# Patient Record
Sex: FEMALE | Race: WHITE | URBAN - METROPOLITAN AREA
[De-identification: names, ages, dates, MRNs, and addresses within clinical notes are randomized per-mention and may not be internally consistent; named-entity substitution may affect disease eponyms.]

---

## 2018-09-27 ENCOUNTER — HOSPITAL ENCOUNTER (OUTPATIENT)
Dept: FAMILY MEDICINE CLINIC | Facility: CLINIC | Age: 33
Setting detail: SPECIMEN
Discharge: HOME | End: 2018-09-27
Attending: FAMILY MEDICINE | Admitting: FAMILY MEDICINE

## 2018-09-27 LAB
BASOPHILS # BLD AUTO: 0 10*3/UL (ref 0–0.2)
BASOPHILS NFR BLD AUTO: 0 % (ref 0–2)
CRP SERPL-MCNC: 0.91 MG/DL (ref 0–0.7)
DIFFERENTIAL METHOD BLD: (no result)
EOSINOPHIL # BLD AUTO: 0.1 10*3/UL (ref 0–0.3)
EOSINOPHIL # BLD AUTO: 2 % (ref 0–3)
ERYTHROCYTE [DISTWIDTH] IN BLOOD BY AUTOMATED COUNT: 14.6 % (ref 11.5–14.5)
ERYTHROCYTE [SEDIMENTATION RATE] IN BLOOD BY WESTERGREN METHOD: 22 MM/HR (ref 0–20)
HCT VFR BLD AUTO: 35.6 % (ref 35–49)
HGB BLD-MCNC: 11.9 G/DL (ref 12–15)
LYMPHOCYTES # BLD AUTO: 2.1 10*3/UL (ref 0.8–4.8)
LYMPHOCYTES NFR BLD AUTO: 32 % (ref 18–42)
MCH RBC QN AUTO: 28.9 PG (ref 26–32)
MCHC RBC AUTO-ENTMCNC: 33.5 G/DL (ref 32–36)
MCV RBC AUTO: 86.4 FL (ref 80–94)
MONOCYTES # BLD AUTO: 0.3 10*3/UL (ref 0.1–1.3)
MONOCYTES NFR BLD AUTO: 5 % (ref 2–11)
NEUTROPHILS # BLD AUTO: 4.1 10*3/UL (ref 2.3–8.6)
NEUTROPHILS NFR BLD AUTO: 61 % (ref 50–75)
NRBC BLD AUTO-RTO: 0 /100{WBCS}
NRBC/RBC NFR BLD MANUAL: 0 10*3/UL
PLATELET # BLD AUTO: 247 10*3/UL (ref 150–450)
PMV BLD AUTO: 9.5 FL (ref 7.4–10.4)
RBC # BLD AUTO: 4.13 10*6/UL (ref 4–5.4)
WBC # BLD AUTO: 6.6 10*3/UL (ref 4.5–11.5)

## 2018-09-27 PROCEDURE — 86481 TB AG RESPONSE T-CELL SUSP: CPT

## 2018-09-28 ENCOUNTER — LAB REQUISITION (OUTPATIENT)
Dept: LAB | Facility: HOSPITAL | Age: 33
End: 2018-09-28

## 2018-09-28 DIAGNOSIS — Z00.00 ROUTINE GENERAL MEDICAL EXAMINATION AT A HEALTH CARE FACILITY: ICD-10-CM

## 2018-09-28 LAB
A PHAGOCYTOPH IGG SER QL: NORMAL
A PHAGOCYTOPH IGM SER QL: NORMAL
ANA SER QL IA: NORMAL
B BURGDOR IGG+IGM SER-ACNC: NEGATIVE
CHROMATIN AB SERPL-ACNC: <20 [IU]/ML (ref 0–20)
CONV HIV-1/ HIV-2: NORMAL
CONV HIV-1/ HIV-2: NORMAL
E CHAFFEENSIS IGG TITR SER IF: NORMAL {TITER}
EHRLICHIA CHAFFEENSIS AB, IGM: NORMAL
R TYPHI IGG TITR SER IF: NOT DETECTED {TITER}
R TYPHI IGM SER-ACNC: NOT DETECTED
RICK SF IGG TITR SER IF: NOT DETECTED {TITER}
RICK SF IGM TITR SER IF: NOT DETECTED {TITER}
T PALLIDUM IGG SER QL: NONREACTIVE

## 2018-09-29 LAB
TSPOT INTERPRETATION: NEGATIVE
TSPOT NIL CONTROL INTERPRETATION: NORMAL
TSPOT PANEL A: 0
TSPOT PANEL B: 0
TSPOT POS CONTROL INTERPRETATION: NORMAL

## 2018-10-01 LAB — TSPOT INTERPRETATION: NORMAL

## 2023-04-25 ENCOUNTER — OFFICE VISIT (OUTPATIENT)
Dept: FAMILY MEDICINE CLINIC | Facility: CLINIC | Age: 38
End: 2023-04-25
Payer: COMMERCIAL

## 2023-04-25 VITALS
BODY MASS INDEX: 28.82 KG/M2 | DIASTOLIC BLOOD PRESSURE: 60 MMHG | TEMPERATURE: 95.7 F | WEIGHT: 173 LBS | OXYGEN SATURATION: 100 % | SYSTOLIC BLOOD PRESSURE: 102 MMHG | HEART RATE: 61 BPM | HEIGHT: 65 IN

## 2023-04-25 DIAGNOSIS — Z11.59 ENCOUNTER FOR HEPATITIS C SCREENING TEST FOR LOW RISK PATIENT: ICD-10-CM

## 2023-04-25 DIAGNOSIS — E03.9 HYPOTHYROIDISM, UNSPECIFIED TYPE: ICD-10-CM

## 2023-04-25 DIAGNOSIS — B02.29 HERPES ZOSTER INVOLVING CERVICAL DERMATOME: ICD-10-CM

## 2023-04-25 DIAGNOSIS — F32.81 PMDD (PREMENSTRUAL DYSPHORIC DISORDER): ICD-10-CM

## 2023-04-25 DIAGNOSIS — E78.00 HYPERCHOLESTEREMIA: ICD-10-CM

## 2023-04-25 DIAGNOSIS — Z76.89 ESTABLISHING CARE WITH NEW DOCTOR, ENCOUNTER FOR: Primary | ICD-10-CM

## 2023-04-25 DIAGNOSIS — R61 NIGHT SWEATS: ICD-10-CM

## 2023-04-25 DIAGNOSIS — B00.1 HERPES LABIALIS: ICD-10-CM

## 2023-04-25 DIAGNOSIS — R45.86 MOOD CHANGES: ICD-10-CM

## 2023-04-25 DIAGNOSIS — R53.83 OTHER FATIGUE: ICD-10-CM

## 2023-04-25 DIAGNOSIS — I73.00 RAYNAUD'S DISEASE WITHOUT GANGRENE: ICD-10-CM

## 2023-04-25 RX ORDER — LEVOTHYROXINE SODIUM 50 MCG
50 TABLET ORAL DAILY
COMMUNITY
Start: 2023-03-07

## 2023-04-25 RX ORDER — SERTRALINE HYDROCHLORIDE 100 MG/1
100 TABLET, FILM COATED ORAL 2 TIMES DAILY
COMMUNITY
Start: 2023-02-27

## 2023-04-25 RX ORDER — NORGESTIMATE AND ETHINYL ESTRADIOL 0.25-0.035
0.25 KIT ORAL DAILY
COMMUNITY
Start: 2023-01-29

## 2023-04-25 RX ORDER — ACYCLOVIR 50 MG/G
OINTMENT TOPICAL
COMMUNITY
Start: 2023-03-14

## 2023-04-25 NOTE — PROGRESS NOTES
"Chief Complaint  Depression, Anxiety, and Fatigue    Subjective          Wanda Henao presents to [facility]      History of Present Illness    Wanda is a 38 year old female patient who presents today to [facility] care.     She was formerly of [employer] [employer]. Has hx of hyperlipidemia, hypothyroidism, depression, anxiety, Raynaud's disease.     She takes estarylla and been on for more than ten years. She reports that week of or a few days before her period she with symptoms of tiredness and fatigue, hot flashes, intermittent night sweats.  Additionally, her mood shifts around her cycle.  She reports her  always tells her her \"mood is off \"during this time.  She does report once her thyroid got straightened out her hot flashes improved and she does not have any longer. She is with low back pain for some of the days during her cycle typically only last a few days. She follows Dr Alonzo monthly for chiropractic adjustments.  She reports this helped a lot    She sees a therapist which has improved her depression and anxiety.  She does continue to take Zoloft 100 mg twice daily. she works with her  and two other family members which is stressful. 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     Has history of Raynaud's - flares up more so in the fall and winter or cold environments like grocery, carrying cold items, etc. Does not interfere with her day to day activities.  does frequently wear gloves during the winter months to keep hands warm.     She does have herpes labialis. She also has a rash breakout once a year on cervical neck.  Patient reports every February rash develops, is painful before, during and several weeks after.  Area has healed, but pink and macular currently.  She has no pain at this moment but does report the pain lingers after lesions have healed. She indicates it burns and tingles prior to outbreak. Exam reveals this appears " "to be a form of shingles.     Requests 3 months supply of medications.  She currently does not need refills at the moment.    Blood pressure well today at 102/60.  No cardiac symptoms or complaints.      Objective     Vital Signs:   /60 (BP Location: Right arm, Patient Position: Sitting, Cuff Size: Adult)   Pulse 61   Temp 95.7 °F (35.4 °C) (Infrared)   Ht 165.1 cm (65\")   Wt 78.5 kg (173 lb)   SpO2 100%   BMI 28.79 kg/m²           Physical Exam  Vitals reviewed.   Constitutional:       Appearance: Normal appearance. She is well-developed.      Comments: Wearing a face mask     HENT:      Head: Normocephalic and atraumatic.      Nose: Nose normal.      Mouth/Throat:      Mouth: Mucous membranes are moist.      Pharynx: Oropharynx is clear.   Eyes:      Conjunctiva/sclera: Conjunctivae normal.      Pupils: Pupils are equal, round, and reactive to light.   Cardiovascular:      Rate and Rhythm: Normal rate and regular rhythm.      Pulses: Normal pulses.      Heart sounds: Normal heart sounds. No murmur heard.  Pulmonary:      Effort: Pulmonary effort is normal. No respiratory distress.      Breath sounds: Normal breath sounds.   Abdominal:      General: Bowel sounds are normal. There is no distension.      Palpations: Abdomen is soft. There is no mass.      Tenderness: There is no abdominal tenderness. There is no guarding or rebound.      Hernia: No hernia is present.   Musculoskeletal:         General: Normal range of motion.      Cervical back: Normal range of motion.   Skin:     General: Skin is warm and dry.      Findings: No rash.             Comments: Midline cervical neck dermatome with macular dark pink scarring present.  No active lesions.   Neurological:      Mental Status: She is alert and oriented to person, place, and time.   Psychiatric:         Behavior: Behavior normal.                Result Review :                                   Assessment and Plan      Diagnoses and all orders for " this visit:    1. Establishing care with new doctor, encounter for (Primary)    2. Hypothyroidism, unspecified type  -     Vitamin D 25 hydroxy  -     Vitamin B12  -     TSH  -     T4  -     T3, free    3. Other fatigue  -     Vitamin D 25 hydroxy  -     Vitamin B12  -     TSH  -     T4  -     T3, free  -     Iron and TIBC  -     Ferritin  -     CBC & Differential  -     Comprehensive Metabolic Panel  -     Hemoglobin A1c  -     Estradiol  -     Follicle Stimulating Hormone  -     Testosterone, Free, Total  -     Progesterone    4. PMDD (premenstrual dysphoric disorder)    5. Mood changes  -     Estradiol  -     Follicle Stimulating Hormone  -     Testosterone, Free, Total  -     Progesterone    6. Night sweats  -     Estradiol  -     Follicle Stimulating Hormone  -     Testosterone, Free, Total  -     Progesterone    7. Herpes labialis    8. Herpes zoster involving cervical dermatome    9. Hypercholesteremia  -     Lipid Panel With LDL / HDL Ratio    10. Raynaud's disease without gangrene    11. Encounter for hepatitis C screening test for low risk patient  -     Hepatitis C antibody      Patient newly establishing care.  She has a few chronic medical conditions but her main concern is to get some lab work today to check her thyroid fatigue and other complaints.  Patient thinks she is having hormonal issues.  She is wanting to check her female hormones.  Night sweats could be related to Zoloft, thyroid, other underlying condition.  Very infrequent and not extremely bothersome to patient.                    Follow Up       No follow-ups on file.      Patient was given instructions and counseling regarding her condition or for health maintenance advice. Please see specific information pulled into the AVS if appropriate.     Reny Nieves, APRN4/25/202312:44 EDT  This note has been electronically signed

## 2023-04-26 LAB
25(OH)D3+25(OH)D2 SERPL-MCNC: 33.6 NG/ML (ref 30–100)
ALBUMIN SERPL-MCNC: 4.7 G/DL (ref 3.8–4.8)
ALBUMIN/GLOB SERPL: 1.7 {RATIO} (ref 1.2–2.2)
ALP SERPL-CCNC: 51 IU/L (ref 44–121)
ALT SERPL-CCNC: 12 IU/L (ref 0–32)
AST SERPL-CCNC: 22 IU/L (ref 0–40)
BASOPHILS # BLD AUTO: 0 X10E3/UL (ref 0–0.2)
BASOPHILS NFR BLD AUTO: 0 %
BILIRUB SERPL-MCNC: 0.4 MG/DL (ref 0–1.2)
BUN SERPL-MCNC: 14 MG/DL (ref 6–20)
BUN/CREAT SERPL: 17 (ref 9–23)
CALCIUM SERPL-MCNC: 9.5 MG/DL (ref 8.7–10.2)
CHLORIDE SERPL-SCNC: 104 MMOL/L (ref 96–106)
CHOLEST SERPL-MCNC: 307 MG/DL (ref 100–199)
CO2 SERPL-SCNC: 22 MMOL/L (ref 20–29)
CREAT SERPL-MCNC: 0.82 MG/DL (ref 0.57–1)
EGFRCR SERPLBLD CKD-EPI 2021: 93 ML/MIN/1.73
EOSINOPHIL # BLD AUTO: 0.1 X10E3/UL (ref 0–0.4)
EOSINOPHIL NFR BLD AUTO: 1 %
ERYTHROCYTE [DISTWIDTH] IN BLOOD BY AUTOMATED COUNT: 13.1 % (ref 11.7–15.4)
ESTRADIOL SERPL-MCNC: 136 PG/ML
FERRITIN SERPL-MCNC: 15 NG/ML (ref 15–150)
FSH SERPL-ACNC: 8.7 MIU/ML
GLOBULIN SER CALC-MCNC: 2.7 G/DL (ref 1.5–4.5)
GLUCOSE SERPL-MCNC: 79 MG/DL (ref 70–99)
HBA1C MFR BLD: 5.2 % (ref 4.8–5.6)
HCT VFR BLD AUTO: 37.2 % (ref 34–46.6)
HCV IGG SERPL QL IA: NON REACTIVE
HDLC SERPL-MCNC: 61 MG/DL
HGB BLD-MCNC: 12.6 G/DL (ref 11.1–15.9)
IMM GRANULOCYTES # BLD AUTO: 0 X10E3/UL (ref 0–0.1)
IMM GRANULOCYTES NFR BLD AUTO: 0 %
IRON SATN MFR SERPL: 16 % (ref 15–55)
IRON SERPL-MCNC: 80 UG/DL (ref 27–159)
LABORATORY COMMENT REPORT: ABNORMAL
LDLC SERPL CALC-MCNC: 208 MG/DL (ref 0–99)
LDLC/HDLC SERPL: 3.4 RATIO (ref 0–3.2)
LYMPHOCYTES # BLD AUTO: 1.9 X10E3/UL (ref 0.7–3.1)
LYMPHOCYTES NFR BLD AUTO: 31 %
MCH RBC QN AUTO: 28.8 PG (ref 26.6–33)
MCHC RBC AUTO-ENTMCNC: 33.9 G/DL (ref 31.5–35.7)
MCV RBC AUTO: 85 FL (ref 79–97)
MONOCYTES # BLD AUTO: 0.4 X10E3/UL (ref 0.1–0.9)
MONOCYTES NFR BLD AUTO: 6 %
NEUTROPHILS # BLD AUTO: 3.8 X10E3/UL (ref 1.4–7)
NEUTROPHILS NFR BLD AUTO: 62 %
PLATELET # BLD AUTO: 239 X10E3/UL (ref 150–450)
POTASSIUM SERPL-SCNC: 5.3 MMOL/L (ref 3.5–5.2)
PROGEST SERPL-MCNC: 5.2 NG/ML
PROT SERPL-MCNC: 7.4 G/DL (ref 6–8.5)
RBC # BLD AUTO: 4.38 X10E6/UL (ref 3.77–5.28)
SODIUM SERPL-SCNC: 142 MMOL/L (ref 134–144)
T3FREE SERPL-MCNC: 3.4 PG/ML (ref 2–4.4)
T4 SERPL-MCNC: 8.5 UG/DL (ref 4.5–12)
TIBC SERPL-MCNC: 494 UG/DL (ref 250–450)
TRIGL SERPL-MCNC: 198 MG/DL (ref 0–149)
TSH SERPL DL<=0.005 MIU/L-ACNC: 3.03 UIU/ML (ref 0.45–4.5)
UIBC SERPL-MCNC: 414 UG/DL (ref 131–425)
VIT B12 SERPL-MCNC: 407 PG/ML (ref 232–1245)
VLDLC SERPL CALC-MCNC: 38 MG/DL (ref 5–40)
WBC # BLD AUTO: 6.2 X10E3/UL (ref 3.4–10.8)

## 2023-04-27 DIAGNOSIS — B02.29 HERPES ZOSTER INVOLVING CERVICAL DERMATOME: Primary | ICD-10-CM

## 2023-04-27 DIAGNOSIS — E78.00 HYPERCHOLESTEREMIA: ICD-10-CM

## 2023-04-27 LAB
TESTOST FREE SERPL-MCNC: <0.2 PG/ML (ref 0–4.2)
TESTOST SERPL-MCNC: 11 NG/DL (ref 8–60)

## 2023-04-27 RX ORDER — ACYCLOVIR 400 MG/1
800 TABLET ORAL
Qty: 70 TABLET | Refills: 0 | Status: SHIPPED | OUTPATIENT
Start: 2023-04-27 | End: 2023-05-04

## 2023-04-27 RX ORDER — ROSUVASTATIN CALCIUM 10 MG/1
10 TABLET, COATED ORAL DAILY
Qty: 90 TABLET | Refills: 3 | Status: SHIPPED | OUTPATIENT
Start: 2023-04-27

## 2023-04-27 NOTE — PROGRESS NOTES
Please advise patient statin has been sent in.  Additionally, I sent prescription for acyclovir to be taken 5 times daily, 2 capsules each dose.  She will do this for 7 days.

## 2023-04-27 NOTE — PROGRESS NOTES
Please notify Wanda that her labs have returned.  She is not diabetic or prediabetic. Blood counts are all normal, thyroid panel normal.  Vitamin panel normal.  Hormonal panel all normal.  Her cholesterol is worse than it was 3 years ago.  I do recommend placing on a low-dose statin for prevention due to family history.  If she is willing to do so please let me know so I can send that in?  It is taken once daily.    Her iron level is slightly elevated but this is not anything significant.

## 2023-05-11 DIAGNOSIS — I73.00 RAYNAUD'S DISEASE WITHOUT GANGRENE: Primary | ICD-10-CM

## 2023-05-11 DIAGNOSIS — B00.1 HERPES LABIALIS: ICD-10-CM

## 2023-05-11 DIAGNOSIS — B02.29 HERPES ZOSTER INVOLVING CERVICAL DERMATOME: ICD-10-CM

## 2023-05-15 DIAGNOSIS — B02.29 HERPES ZOSTER INVOLVING CERVICAL DERMATOME: Primary | ICD-10-CM

## 2023-10-30 RX ORDER — ACYCLOVIR 50 MG/G
OINTMENT TOPICAL EVERY 4 HOURS
Qty: 30 G | Refills: 1 | Status: SHIPPED | OUTPATIENT
Start: 2023-10-30

## 2023-10-30 RX ORDER — SERTRALINE HYDROCHLORIDE 100 MG/1
100 TABLET, FILM COATED ORAL DAILY
Qty: 90 TABLET | Refills: 1 | Status: SHIPPED | OUTPATIENT
Start: 2023-10-30

## 2023-10-30 RX ORDER — LEVOTHYROXINE SODIUM 50 MCG
50 TABLET ORAL DAILY
Qty: 90 TABLET | Refills: 1 | Status: SHIPPED | OUTPATIENT
Start: 2023-10-30

## 2023-10-30 RX ORDER — NORGESTIMATE AND ETHINYL ESTRADIOL 0.25-0.035
1 KIT ORAL DAILY
Qty: 28 TABLET | Refills: 11 | Status: SHIPPED | OUTPATIENT
Start: 2023-10-30

## 2024-01-09 ENCOUNTER — OFFICE VISIT (OUTPATIENT)
Dept: FAMILY MEDICINE CLINIC | Facility: CLINIC | Age: 39
End: 2024-01-09
Payer: COMMERCIAL

## 2024-01-09 VITALS
BODY MASS INDEX: 29.32 KG/M2 | OXYGEN SATURATION: 99 % | HEART RATE: 67 BPM | WEIGHT: 176 LBS | DIASTOLIC BLOOD PRESSURE: 70 MMHG | HEIGHT: 65 IN | SYSTOLIC BLOOD PRESSURE: 92 MMHG | TEMPERATURE: 94.6 F

## 2024-01-09 DIAGNOSIS — B00.1 HERPES LABIALIS: ICD-10-CM

## 2024-01-09 DIAGNOSIS — E66.3 OVERWEIGHT (BMI 25.0-29.9): ICD-10-CM

## 2024-01-09 DIAGNOSIS — E03.9 HYPOTHYROIDISM, UNSPECIFIED TYPE: Primary | ICD-10-CM

## 2024-01-09 DIAGNOSIS — B02.29 HERPES ZOSTER INVOLVING CERVICAL DERMATOME: ICD-10-CM

## 2024-01-09 DIAGNOSIS — E78.00 HYPERCHOLESTEREMIA: ICD-10-CM

## 2024-01-09 PROBLEM — Z11.59 ENCOUNTER FOR HEPATITIS C SCREENING TEST FOR LOW RISK PATIENT: Status: RESOLVED | Noted: 2023-04-25 | Resolved: 2024-01-09

## 2024-01-09 PROCEDURE — 99395 PREV VISIT EST AGE 18-39: CPT | Performed by: NURSE PRACTITIONER

## 2024-01-09 NOTE — PROGRESS NOTES
"Chief Complaint  Annual Exam        Wanda Henao presents to [facility]        Subjective      38 year old female patient who presents today for annual physical.     She is on Zoloft and doing well overall. No fluctuations in mood. She is seeing a therapist and has for a few years. She switched jobs last year and is happier with that change. She is going with a friend to do [employer]ak[employer] yoga and really like this as she is more aware of herself and surroundings. BP excellent. She is eating healthier and really enjoying things more now than last year. She is less fatigued and with more energy than last year.     Has been more consistent with thyroid mediation on schedule of taking. Currently on synthroid 50 mcg daily. No palpitations, hot or cold intolerances.     She does take in multiple animals of all sorts and often gets scratched or bit by squirrels, raccoons, farm animals, and opossums. 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     Pt is still with herpetic outbreaks on lips, nape of neck. She was unable to get to referral last year due to insurance issues. She would like to see provider again.                         Objective         Vital Signs:     BP 92/70 (BP Location: Left arm, Patient Position: Sitting, Cuff Size: Adult)   Pulse 67   Temp 94.6 °F (34.8 °C) (Infrared)   Ht 165.1 cm (65\")   Wt 79.8 kg (176 lb)   SpO2 99%   BMI 29.29 kg/m²       Physical Exam  Vitals reviewed.   Constitutional:       Appearance: She is well-developed.      Comments:      HENT:      Head: Normocephalic and atraumatic.      Nose: Nose normal.      Mouth/Throat:      Lips: Pink. Lesions present.      Mouth: Mucous membranes are moist.      Pharynx: Oropharynx is clear.        Comments: Small macular erythematous lesion, nonvesicular.  Eyes:      Conjunctiva/sclera: Conjunctivae normal.      Pupils: Pupils are equal, round, and reactive to light.   Cardiovascular:      Rate and " Rhythm: Normal rate and regular rhythm.      Pulses: Normal pulses.      Heart sounds: Normal heart sounds.   Pulmonary:      Effort: Pulmonary effort is normal. No respiratory distress.      Breath sounds: Normal breath sounds. No wheezing, rhonchi or rales.   Chest:      Chest wall: No tenderness.   Abdominal:      General: Bowel sounds are normal. There is no distension.      Palpations: Abdomen is soft. There is no mass.      Tenderness: There is no abdominal tenderness. There is no guarding or rebound.      Hernia: No hernia is present.   Musculoskeletal:         General: Normal range of motion.      Cervical back: Normal range of motion.   Skin:     General: Skin is warm and dry.      Findings: Lesion present. No rash.             Comments: 2 single 3 mm erythematous macular lesions nonvesicular noted left nape of neck.   Neurological:      Mental Status: She is alert and oriented to person, place, and time.   Psychiatric:         Attention and Perception: Attention normal.         Mood and Affect: Mood and affect normal.         Speech: Speech normal.         Behavior: Behavior normal. Behavior is cooperative.         Thought Content: Thought content normal.                History of Present Illness      Patient Active Problem List   Diagnosis    PMDD (premenstrual dysphoric disorder)    Night sweats    Establishing care with new doctor, encounter for    Hypercholesteremia    Hypothyroidism    Raynaud's disease without gangrene    Mood changes    Other fatigue    Herpes zoster involving cervical dermatome    Herpes labialis    BMI 29.0-29.9,adult    Overweight (BMI 25.0-29.9)         Past Medical History:   Diagnosis Date    Depression 2001    Sertraline since 2013.    Encounter for hepatitis C screening test for low risk patient     Hypothyroidism ? 2019    Hot flashes, nightsweats.          Family History   Problem Relation Age of Onset    Tremor Mother     Anxiety disorder Mother     Arthritis Mother      Heart disease Father     Hyperlipidemia Father     Heart attack Father     Mental illness Father         On Eliud’s side of the family    No Known Problems Sister     Hyperlipidemia Sister     Thyroid disease Sister     Raynaud syndrome Sister     Depression Sister     Hyperlipidemia Brother     Tremor Brother           Past Surgical History:   Procedure Laterality Date    HIP SURGERY Left 1997          Social History     Socioeconomic History    Marital status:    Tobacco Use    Smoking status: Never    Smokeless tobacco: Current    Tobacco comments:     None   Substance and Sexual Activity    Alcohol use: Yes     Alcohol/week: 2.0 standard drinks of alcohol     Types: 2 Drinks containing 0.5 oz of alcohol per week    Drug use: Never    Sexual activity: Yes     Partners: Male     Birth control/protection: Birth control pill                    Result Review :                                             Assessment and Plan      Diagnoses and all orders for this visit:    1. Hypothyroidism, unspecified type (Primary)  -     CBC & Differential  -     Comprehensive metabolic panel  -     TSH    2. Hypercholesteremia  -     CBC & Differential  -     Comprehensive metabolic panel  -     Lipid Panel With LDL / HDL Ratio    3. Herpes zoster involving cervical dermatome  -     Ambulatory Referral to Dermatology    4. Herpes labialis  -     Ambulatory Referral to Dermatology    5. BMI 29.0-29.9,adult    6. Overweight (BMI 25.0-29.9)  Assessment & Plan:  Patient's (Body mass index is 29.29 kg/m².) indicates that they are overweight with health conditions that include none . Weight is newly identified. BMI is is above average; BMI management plan is completed. We discussed portion control and increasing exercise.         Patient here for annual physical today.  No major concerns or issues.  She is still on her Zoloft for mood which is doing well.  She is due for her thyroid panel checked.  Stays on Synthroid 50 mcg daily  and is more consistent with the times that she is taking daily.  Continues to take statin.  She is with herpes outbreak oral recurrent and developed similar lesions on the nape of the neck.  Could be shingles but she never was able to see specialist last year going to refer her to dermatology for further evaluation. Not certain what this recurrent rash is.  When she comes to see me is often scabbed over.  She does have a healed lesion on her left lower lip.  Patient follow back annually unless other issues    The patient was counseled regarding nutrition, physical activity, healthy weight, injury prevention, misuse of tobacco, alcohol and illicit drugs, sexual behavior and STI's, contraception, dental health, mental health, immunizations, and screenings.                      Follow Up       Return in about 1 year (around 1/9/2025) for with ANGELINA Page, Annual physical.      Patient was given instructions and counseling regarding her condition or for health maintenance advice. Please see specific information pulled into the AVS if appropriate.     Reny Nieves, APRN1/9/202410:59 EST  This note has been electronically signed

## 2024-01-09 NOTE — ASSESSMENT & PLAN NOTE
Patient's (Body mass index is 29.29 kg/m².) indicates that they are overweight with health conditions that include none . Weight is newly identified. BMI is is above average; BMI management plan is completed. We discussed portion control and increasing exercise.

## 2024-01-10 LAB
ALBUMIN SERPL-MCNC: 4.7 G/DL (ref 3.9–4.9)
ALBUMIN/GLOB SERPL: 2.1 {RATIO} (ref 1.2–2.2)
ALP SERPL-CCNC: 43 IU/L (ref 44–121)
ALT SERPL-CCNC: 23 IU/L (ref 0–32)
AST SERPL-CCNC: 31 IU/L (ref 0–40)
BASOPHILS # BLD AUTO: 0 X10E3/UL (ref 0–0.2)
BASOPHILS NFR BLD AUTO: 0 %
BILIRUB SERPL-MCNC: 0.3 MG/DL (ref 0–1.2)
BUN SERPL-MCNC: 13 MG/DL (ref 6–20)
BUN/CREAT SERPL: 15 (ref 9–23)
CALCIUM SERPL-MCNC: 9.6 MG/DL (ref 8.7–10.2)
CHLORIDE SERPL-SCNC: 106 MMOL/L (ref 96–106)
CHOLEST SERPL-MCNC: 164 MG/DL (ref 100–199)
CO2 SERPL-SCNC: 24 MMOL/L (ref 20–29)
CREAT SERPL-MCNC: 0.85 MG/DL (ref 0.57–1)
EGFRCR SERPLBLD CKD-EPI 2021: 89 ML/MIN/1.73
EOSINOPHIL # BLD AUTO: 0.1 X10E3/UL (ref 0–0.4)
EOSINOPHIL NFR BLD AUTO: 3 %
ERYTHROCYTE [DISTWIDTH] IN BLOOD BY AUTOMATED COUNT: 13.2 % (ref 11.7–15.4)
GLOBULIN SER CALC-MCNC: 2.2 G/DL (ref 1.5–4.5)
GLUCOSE SERPL-MCNC: 90 MG/DL (ref 70–99)
HCT VFR BLD AUTO: 35.6 % (ref 34–46.6)
HDLC SERPL-MCNC: 61 MG/DL
HGB BLD-MCNC: 11.9 G/DL (ref 11.1–15.9)
IMM GRANULOCYTES # BLD AUTO: 0 X10E3/UL (ref 0–0.1)
IMM GRANULOCYTES NFR BLD AUTO: 0 %
LDLC SERPL CALC-MCNC: 82 MG/DL (ref 0–99)
LDLC/HDLC SERPL: 1.3 RATIO (ref 0–3.2)
LYMPHOCYTES # BLD AUTO: 1.7 X10E3/UL (ref 0.7–3.1)
LYMPHOCYTES NFR BLD AUTO: 33 %
MCH RBC QN AUTO: 28.8 PG (ref 26.6–33)
MCHC RBC AUTO-ENTMCNC: 33.4 G/DL (ref 31.5–35.7)
MCV RBC AUTO: 86 FL (ref 79–97)
MONOCYTES # BLD AUTO: 0.3 X10E3/UL (ref 0.1–0.9)
MONOCYTES NFR BLD AUTO: 7 %
NEUTROPHILS # BLD AUTO: 3 X10E3/UL (ref 1.4–7)
NEUTROPHILS NFR BLD AUTO: 57 %
PLATELET # BLD AUTO: 219 X10E3/UL (ref 150–450)
POTASSIUM SERPL-SCNC: 5.2 MMOL/L (ref 3.5–5.2)
PROT SERPL-MCNC: 6.9 G/DL (ref 6–8.5)
RBC # BLD AUTO: 4.13 X10E6/UL (ref 3.77–5.28)
SODIUM SERPL-SCNC: 141 MMOL/L (ref 134–144)
TRIGL SERPL-MCNC: 122 MG/DL (ref 0–149)
TSH SERPL DL<=0.005 MIU/L-ACNC: 2.08 UIU/ML (ref 0.45–4.5)
VLDLC SERPL CALC-MCNC: 21 MG/DL (ref 5–40)
WBC # BLD AUTO: 5.2 X10E3/UL (ref 3.4–10.8)

## 2024-01-31 ENCOUNTER — TELEPHONE (OUTPATIENT)
Dept: FAMILY MEDICINE CLINIC | Facility: CLINIC | Age: 39
End: 2024-01-31
Payer: COMMERCIAL

## 2024-01-31 NOTE — TELEPHONE ENCOUNTER
Caller: Wanda Henao    Relationship: Self    Best call back number: [phone]     Requested Prescriptions:   Requested Prescriptions     Pending Prescriptions Disp Refills    Estarylla 0.25-35 MG-MCG per tablet 28 tablet 11     Sig: Take 1 tablet by mouth Daily.        Pharmacy where request should be sent: [facility] #[id number] - [place], [place] - [address]  AT [place] [phone] [place] [phone] FX     Last office visit with prescribing clinician: 1/9/2024   Last telemedicine visit with prescribing clinician: Visit date not found   Next office visit with prescribing clinician: Visit date not found     Additional details provided by patient: CAN NOT FIND HER BIRTH CONTROL PILLS NEEDS ONE URGENT PRESCRIPTION (ONE MONTH)SENT TO LOCAL PHARMACY   [id number] - [place], [place] - [address]  AT [facility] - [phone] [place] [phone] FX      Does the patient have less than a 3 day supply:  [x] Yes  [] No    Would you like a call back once the refill request has been completed: [x] Yes [] No    If the office needs to give you a call back, can they leave a voicemail: [x] Yes [] No    PATIENT WOULD THEN LIKE A REGULAR REFILL SENT TO HER MAIL ORDER PHARMACY AS WELL  [facility] [address] [phone] [facility] [phone] FX     Mehran Beaver Rep   01/31/24 09:07 EST

## 2024-02-01 RX ORDER — NORGESTIMATE AND ETHINYL ESTRADIOL 0.25-0.035
1 KIT ORAL DAILY
Qty: 28 TABLET | Refills: 0 | Status: SHIPPED | OUTPATIENT
Start: 2024-02-01 | End: 2024-02-01

## 2024-02-01 RX ORDER — NORGESTIMATE AND ETHINYL ESTRADIOL 0.25-0.035
1 KIT ORAL DAILY
Qty: 84 TABLET | Refills: 0 | Status: SHIPPED | OUTPATIENT
Start: 2024-02-01

## 2024-02-01 NOTE — TELEPHONE ENCOUNTER
Immediate prescription of oral contraceptive has been sent to [facility].  Mail order has the refills from the last time I sent those. throbbing/constant/tightness

## 2024-04-18 DIAGNOSIS — E78.00 HYPERCHOLESTEREMIA: ICD-10-CM

## 2024-04-18 RX ORDER — LEVOTHYROXINE SODIUM 50 MCG
50 TABLET ORAL DAILY
Qty: 90 TABLET | Refills: 3 | Status: SHIPPED | OUTPATIENT
Start: 2024-04-18

## 2024-04-18 RX ORDER — NORGESTIMATE AND ETHINYL ESTRADIOL 0.25-0.035
1 KIT ORAL DAILY
Qty: 84 TABLET | Refills: 3 | Status: SHIPPED | OUTPATIENT
Start: 2024-04-18

## 2024-04-18 RX ORDER — SERTRALINE HYDROCHLORIDE 100 MG/1
100 TABLET, FILM COATED ORAL DAILY
Qty: 90 TABLET | Refills: 3 | Status: SHIPPED | OUTPATIENT
Start: 2024-04-18

## 2024-04-18 RX ORDER — ROSUVASTATIN CALCIUM 10 MG/1
10 TABLET, COATED ORAL DAILY
Qty: 90 TABLET | Refills: 3 | Status: SHIPPED | OUTPATIENT
Start: 2024-04-18

## 2024-04-18 NOTE — TELEPHONE ENCOUNTER
Caller: Wanda Henao    Relationship: Self    Best call back number: [phone]     Requested Prescriptions:   Requested Prescriptions     Pending Prescriptions Disp Refills    Estarylla 0.25-35 MG-MCG per tablet 84 tablet 0     Sig: Take 1 tablet by mouth Daily.    sertraline (ZOLOFT) 100 MG tablet 90 tablet 1     Sig: Take 1 tablet by mouth Daily.    rosuvastatin (Crestor) 10 MG tablet 90 tablet 3     Sig: Take 1 tablet by mouth Daily.    Synthroid 50 MCG tablet 90 tablet 1     Sig: Take 1 tablet by mouth Daily.        Pharm[facility] be sent[facility] [place] - [address] [facility] AT [address] TO [address] [facility] [address] - [phone]  - [phone] FX     Last office visit with prescribing clinician: 1/9/2024   Last telemedicine visit with prescribing clinician: Visit date not found   Next office visit with prescribing clinician: Visit date not found     Additional details provided by patient: PATENT WOULD LIKE  TO BE SET UP ON A THREE MONTH SUPPLY INSTEAD OF MONTHLY WITH THESE MEDICATION.     Does the patient have less than a 3 day supply:  [] Yes  [x] No    Would you like a call back once the refill request has been completed: [] Yes [x] No    If the office needs to give you a call back, can they leave a voicemail: [] Yes [x] No    Mehran Calhoun Rep   04/18/24 15:19 EDT

## 2024-05-03 ENCOUNTER — TELEPHONE (OUTPATIENT)
Dept: FAMILY MEDICINE CLINIC | Facility: CLINIC | Age: 39
End: 2024-05-03
Payer: COMMERCIAL

## 2024-05-03 DIAGNOSIS — E78.00 HYPERCHOLESTEREMIA: ICD-10-CM

## 2024-05-03 RX ORDER — ROSUVASTATIN CALCIUM 10 MG/1
10 TABLET, COATED ORAL DAILY
Qty: 90 TABLET | Refills: 3 | Status: SHIPPED | OUTPATIENT
Start: 2024-05-03

## 2024-05-03 RX ORDER — SERTRALINE HYDROCHLORIDE 100 MG/1
100 TABLET, FILM COATED ORAL DAILY
Qty: 90 TABLET | Refills: 3 | Status: SHIPPED | OUTPATIENT
Start: 2024-05-03

## 2024-05-03 RX ORDER — LEVOTHYROXINE SODIUM 50 MCG
50 TABLET ORAL DAILY
Qty: 90 TABLET | Refills: 3 | Status: SHIPPED | OUTPATIENT
Start: 2024-05-03

## 2024-05-03 RX ORDER — NORGESTIMATE AND ETHINYL ESTRADIOL 0.25-0.035
1 KIT ORAL DAILY
Qty: 84 TABLET | Refills: 0 | Status: SHIPPED | OUTPATIENT
Start: 2024-05-03

## 2024-05-03 NOTE — TELEPHONE ENCOUNTER
Caller: Wanda Henao    Relationship: Self    Best call back number: [phone]*    Requested Prescriptions:   Requested Prescriptions     Pending Prescriptions Disp Refills    rosuvastatin (Crestor) 10 MG tablet 90 tablet 3     Sig: Take 1 tablet by mouth Daily.    sertraline (ZOLOFT) 100 MG tablet 90 tablet 3     Sig: Take 1 tablet by mouth Daily.    Synthroid 50 MCG tablet 90 tablet 3     Sig: Take 1 tablet by mouth Daily.        Pharmacy where request should be sent: [facility] #[id number] - [place], [place] - [address] AT [facility] - [phone] [facility] [phone] FX     Last office visit with prescribing clinician: 1/9/2024   Last telemedicine visit with prescribing clinician: Visit date not found   Next office visit with prescribing clinician: Visit date not found     Additional details provided by patient: PATIENT NEEDING NEW 90-DAY PRESCRIPTIONS SENT TO [facility], DUE TO INSURANCE SHE IS NO LONGER USING [place]. PATIENT STATES SHE HAS BEEN OUT OF MEDICATION FOR 2 WEEKS.    Does the patient have less than a 3 day supply:  [x] Yes  [] No    Would you like a call back once the refill request has been completed: [] Yes [x] No    If the office needs to give you a call back, can they leave a voicemail: [] Yes [x] No    Ryan Delatorre   05/03/24 13:16 EDT

## 2024-05-03 NOTE — TELEPHONE ENCOUNTER
Pharmacy Name: [facility] #[id number] - [place], [place] - [address] AT [facility] [address] [facility] - [phone] [facility] [phone] FX     What medication are you calling in regards to: Estarylla 0.25-35 MG-MCG per tablet     What question does the pharmacy have: PATIENT HAS NOT RECEIVED MEDICATION SENT ON 4/18 TO MAIL PHARMACY. JG HAS BEEN OUT OF MEDICATION FOR ONE WEEK AND IS REQUESTING EMERGENCY SCRIPT SENT TO LOCAL [facility]

## 2024-07-29 RX ORDER — NORGESTIMATE AND ETHINYL ESTRADIOL 0.25-0.035
1 KIT ORAL DAILY
Qty: 84 TABLET | Refills: 0 | Status: SHIPPED | OUTPATIENT
Start: 2024-07-29

## 2024-08-28 ENCOUNTER — OFFICE VISIT (OUTPATIENT)
Dept: FAMILY MEDICINE CLINIC | Facility: CLINIC | Age: 39
End: 2024-08-28
Payer: COMMERCIAL

## 2024-08-28 ENCOUNTER — TELEPHONE (OUTPATIENT)
Dept: FAMILY MEDICINE CLINIC | Facility: CLINIC | Age: 39
End: 2024-08-28

## 2024-08-28 VITALS
HEART RATE: 64 BPM | DIASTOLIC BLOOD PRESSURE: 60 MMHG | OXYGEN SATURATION: 99 % | HEIGHT: 65 IN | SYSTOLIC BLOOD PRESSURE: 108 MMHG | BODY MASS INDEX: 29.99 KG/M2 | TEMPERATURE: 96.6 F | WEIGHT: 180 LBS

## 2024-08-28 DIAGNOSIS — E78.00 HYPERCHOLESTEREMIA: ICD-10-CM

## 2024-08-28 DIAGNOSIS — Z00.00 PREVENTATIVE HEALTH CARE: ICD-10-CM

## 2024-08-28 DIAGNOSIS — E03.9 HYPOTHYROIDISM, UNSPECIFIED TYPE: Primary | ICD-10-CM

## 2024-08-28 DIAGNOSIS — Z12.31 OTHER SCREENING MAMMOGRAM: ICD-10-CM

## 2024-08-28 PROCEDURE — 99214 OFFICE O/P EST MOD 30 MIN: CPT | Performed by: NURSE PRACTITIONER

## 2024-08-28 RX ORDER — VALACYCLOVIR HYDROCHLORIDE 1 G/1
1000 TABLET, FILM COATED ORAL 2 TIMES DAILY
Qty: 20 TABLET | Refills: 0 | Status: SHIPPED | OUTPATIENT
Start: 2024-08-28

## 2024-08-28 RX ORDER — FLUCONAZOLE 150 MG/1
TABLET ORAL
Qty: 1 TABLET | Refills: 0 | Status: SHIPPED | OUTPATIENT
Start: 2024-08-28 | End: 2024-08-28 | Stop reason: SDUPTHER

## 2024-08-28 RX ORDER — FLUCONAZOLE 150 MG/1
TABLET ORAL
Qty: 2 TABLET | Refills: 0 | Status: SHIPPED | OUTPATIENT
Start: 2024-08-28

## 2024-08-28 RX ORDER — SERTRALINE HYDROCHLORIDE 100 MG/1
100 TABLET, FILM COATED ORAL DAILY
Qty: 180 TABLET | Refills: 2 | Status: SHIPPED | OUTPATIENT
Start: 2024-08-28

## 2024-08-28 NOTE — PROGRESS NOTES
Wanda Henao is a 39 y.o. female.     History of Present Illness  39-year-old white female patient who presents today with history of hypothyroidism, Raynaud's disease, hyperlipidemia, herpes, depression anxiety who comes in today to be established as a new patient    Blood pressure 108/60 heart rate 64 she denies any chest pain, dyspnea, tachycardia or dizziness    Patient has a lot of hot flashes apparently due to her thyroid levels.  She has never had a thyroid ultrasound has not been endocrinology will order both today and will get thyroid levels with blood work    Patient Zoloft dose was called in incorrectly she takes 100 mg twice a day I called that in    She is also complaining of a yeast infection we will call in some Diflucan    Patient has bumps on the back of her neck around the scalp and on the scalp it has been there for over a year and a half that are painful.  She was initially diagnosed with shingles but she states was never treated for it.  She has been a 1 dermatologist who said they did not know what it was.  I am going to treat her with Valtrex for 10 days if no improvement will refer her to a different dermatologist.  I advised her to not use any shampoos that have fragrances and possibly get allergy tested.  She does have 1 spot now on the inside of her right ear    Weight is 180 with a BMI of 29.9.  She has had no COVID vaccines need to schedule an eye exam.  I am ordering her her first mammogram at [place] which she will schedule and I am setting her up with OB/GYN for Pap smears and to be checked for HPV since she states she does have HPV          Fasting blood work  Thyroid ultrasound  Mammogram at [place]  Diflucan 150 daily x 2 days  Valtrex 1 g twice daily x 10 days  Do not use any shampoos with fragrances  Dermatology referral if Valtrex does not work         The following portions of the patient's history were reviewed and updated as appropriate: allergies, current medications, past  "family history, past medical history, past social history, past surgical history, and problem list.    Vitals:    08/28/24 1304   BP: 108/60   BP Location: Right arm   Patient Position: Sitting   Cuff Size: Adult   Pulse: 64   Temp: 96.6 °F (35.9 °C)   TempSrc: Infrared   SpO2: 99%   Weight: 81.6 kg (180 lb)   Height: 165.1 cm (65\")     Body mass index is 29.95 kg/m².          Past Medical History:   Diagnosis Date    Depression 2001    Sertraline since 2013.    Encounter for hepatitis C screening test for low risk patient     Hypothyroidism ? 2019    Hot flashes, nightsweats.     Past Surgical History:   Procedure Laterality Date    HIP SURGERY Left 1997     Family History   Problem Relation Age of Onset    Tremor Mother     Anxiety disorder Mother     Arthritis Mother     Heart disease Father     Hyperlipidemia Father     Heart attack Father     Mental illness Father         On Eliud’s side of the family    No Known Problems Sister     Hyperlipidemia Sister     Thyroid disease Sister     Raynaud syndrome Sister     Depression Sister     Hyperlipidemia Brother     Tremor Brother      Immunization History   Administered Date(s) Administered    Flu Vaccine Quad PF 6-35MO 01/07/2019    Fluzone (or Fluarix & Flulaval for VFC) >6mos 01/07/2019    Hepatitis A 04/26/2006, 10/27/2006    Hepatitis B Adult/Adolescent IM 06/11/2002, 07/16/2002, 05/03/2006    IPV 05/03/2006    MMR 07/02/1985, 03/16/1995    Meningococcal MCV4P (Menactra) 04/26/2006    PPD Test 09/18/2006    Td (TDVAX) 07/21/2000    Tdap 05/03/2006, 08/01/2013    Typhoid Inactivated 09/20/2013    Typhoid Live 04/26/2006       Office Visit on 01/09/2024   Component Date Value Ref Range Status    WBC 01/09/2024 5.2  3.4 - 10.8 x10E3/uL Final    RBC 01/09/2024 4.13  3.77 - 5.28 x10E6/uL Final    Hemoglobin 01/09/2024 11.9  11.1 - 15.9 g/dL Final    Hematocrit 01/09/2024 35.6  34.0 - 46.6 % Final    MCV 01/09/2024 86  79 - 97 fL Final    MCH 01/09/2024 28.8  26.6 - " 33.0 pg Final    MCHC 01/09/2024 33.4  31.5 - 35.7 g/dL Final    RDW 01/09/2024 13.2  11.7 - 15.4 % Final    Platelets 01/09/2024 219  150 - 450 x10E3/uL Final    Neutrophil Rel % 01/09/2024 57  Not Estab. % Final    Lymphocyte Rel % 01/09/2024 33  Not Estab. % Final    Monocyte Rel % 01/09/2024 7  Not Estab. % Final    Eosinophil Rel % 01/09/2024 3  Not Estab. % Final    Basophil Rel % 01/09/2024 0  Not Estab. % Final    Neutrophils Absolute 01/09/2024 3.0  1.4 - 7.0 x10E3/uL Final    Lymphocytes Absolute 01/09/2024 1.7  0.7 - 3.1 x10E3/uL Final    Monocytes Absolute 01/09/2024 0.3  0.1 - 0.9 x10E3/uL Final    Eosinophils Absolute 01/09/2024 0.1  0.0 - 0.4 x10E3/uL Final    Basophils Absolute 01/09/2024 0.0  0.0 - 0.2 x10E3/uL Final    Immature Granulocyte Rel % 01/09/2024 0  Not Estab. % Final    Immature Grans Absolute 01/09/2024 0.0  0.0 - 0.1 x10E3/uL Final    Glucose 01/09/2024 90  70 - 99 mg/dL Final    BUN 01/09/2024 13  6 - 20 mg/dL Final    Creatinine 01/09/2024 0.85  0.57 - 1.00 mg/dL Final    EGFR Result 01/09/2024 89  >59 mL/min/1.73 Final    BUN/Creatinine Ratio 01/09/2024 15  9 - 23 Final    Sodium 01/09/2024 141  134 - 144 mmol/L Final    Potassium 01/09/2024 5.2  3.5 - 5.2 mmol/L Final    Chloride 01/09/2024 106  96 - 106 mmol/L Final    Total CO2 01/09/2024 24  20 - 29 mmol/L Final    Calcium 01/09/2024 9.6  8.7 - 10.2 mg/dL Final    Total Protein 01/09/2024 6.9  6.0 - 8.5 g/dL Final    Albumin 01/09/2024 4.7  3.9 - 4.9 g/dL Final    Globulin 01/09/2024 2.2  1.5 - 4.5 g/dL Final    A/G Ratio 01/09/2024 2.1  1.2 - 2.2 Final    Total Bilirubin 01/09/2024 0.3  0.0 - 1.2 mg/dL Final    Alkaline Phosphatase 01/09/2024 43 (L)  44 - 121 IU/L Final    AST (SGOT) 01/09/2024 31  0 - 40 IU/L Final    ALT (SGPT) 01/09/2024 23  0 - 32 IU/L Final    TSH 01/09/2024 2.080  0.450 - 4.500 uIU/mL Final    Total Cholesterol 01/09/2024 164  100 - 199 mg/dL Final    Triglycerides 01/09/2024 122  0 - 149 mg/dL Final     HDL Cholesterol 01/09/2024 61  >39 mg/dL Final    VLDL Cholesterol Fredy 01/09/2024 21  5 - 40 mg/dL Final    LDL Chol Calc (NIH) 01/09/2024 82  0 - 99 mg/dL Final    LDL/HDL RATIO 01/09/2024 1.3  0.0 - 3.2 ratio Final    Comment:                                     LDL/HDL Ratio                                              Men  Women                                1/2 Avg.Risk  1.0    1.5                                    Avg.Risk  3.6    3.2                                 2X Avg.Risk  6.2    5.0                                 3X Avg.Risk  8.0    6.1           Review of Systems    Objective   Physical Exam    Procedures    Assessment & Plan   Diagnoses and all orders for this visit:    1. Hypothyroidism, unspecified type (Primary)  -     Ambulatory Referral to Endocrinology  -     TSH+Free T4; Future  -     T3; Future    2. Hypercholesteremia  -     Lipid Panel With LDL / HDL Ratio; Future    3. Preventative health care  -     CBC & Differential; Future  -     Comprehensive Metabolic Panel; Future  -     Hemoglobin A1c; Future    4. Other screening mammogram  -     Mammo Screening Digital Tomosynthesis Bilateral With CAD; Future    Other orders  -     sertraline (ZOLOFT) 100 MG tablet; Take 1 tablet by mouth Daily.  Dispense: 180 tablet; Refill: 2  -     fluconazole (Diflucan) 150 MG tablet; One tab daily x2 tabs  Dispense: 1 tablet; Refill: 0  -     valACYclovir (Valtrex) 1000 MG tablet; Take 1 tablet by mouth 2 (Two) Times a Day.  Dispense: 20 tablet; Refill: 0           Current Outpatient Medications:     sertraline (ZOLOFT) 100 MG tablet, Take 1 tablet by mouth Daily., Disp: 180 tablet, Rfl: 2    acyclovir (ZOVIRAX) 5 % ointment, Apply  topically to the appropriate area as directed Every 4 (Four) Hours., Disp: 30 g, Rfl: 1    Estarylla 0.25-35 MG-MCG per tablet, TAKE 1 TABLET BY MOUTH DAILY, Disp: 84 tablet, Rfl: 0    fluconazole (Diflucan) 150 MG tablet, One tab daily x2 tabs, Disp: 1 tablet, Rfl: 0     rosuvastatin (Crestor) 10 MG tablet, Take 1 tablet by mouth Daily., Disp: 90 tablet, Rfl: 3    Synthroid 50 MCG tablet, Take 1 tablet by mouth Daily., Disp: 90 tablet, Rfl: 3    valACYclovir (Valtrex) 1000 MG tablet, Take 1 tablet by mouth 2 (Two) Times a Day., Disp: 20 tablet, Rfl: 0           Linda Keane, BRIGIDO 8/28/2024 14:37 EDT  This note has been electronically signed

## 2024-08-28 NOTE — PATIENT INSTRUCTIONS
Fasting blood work  Thyroid ultrasound  Mammogram at [place]  Diflucan 150 daily x 2 days  Valtrex 1 g twice daily x 10 days  Do not use any shampoos with fragrances  Dermatology referral if Valtrex does not work

## 2024-08-30 LAB
ALBUMIN SERPL-MCNC: 4.3 G/DL (ref 3.9–4.9)
ALP SERPL-CCNC: 46 IU/L (ref 44–121)
ALT SERPL-CCNC: 10 IU/L (ref 0–32)
AST SERPL-CCNC: 17 IU/L (ref 0–40)
BASOPHILS # BLD AUTO: 0 X10E3/UL (ref 0–0.2)
BASOPHILS NFR BLD AUTO: 0 %
BILIRUB SERPL-MCNC: 0.4 MG/DL (ref 0–1.2)
BUN SERPL-MCNC: 16 MG/DL (ref 6–24)
BUN/CREAT SERPL: 17 (ref 9–23)
CALCIUM SERPL-MCNC: 9.3 MG/DL (ref 8.7–10.2)
CHLORIDE SERPL-SCNC: 103 MMOL/L (ref 96–106)
CHOLEST SERPL-MCNC: 217 MG/DL (ref 100–199)
CO2 SERPL-SCNC: 21 MMOL/L (ref 20–29)
CREAT SERPL-MCNC: 0.92 MG/DL (ref 0.57–1)
EGFRCR SERPLBLD CKD-EPI 2021: 81 ML/MIN/1.73
EOSINOPHIL # BLD AUTO: 0.3 X10E3/UL (ref 0–0.4)
EOSINOPHIL NFR BLD AUTO: 4 %
ERYTHROCYTE [DISTWIDTH] IN BLOOD BY AUTOMATED COUNT: 13.4 % (ref 11.7–15.4)
GLOBULIN SER CALC-MCNC: 2.5 G/DL (ref 1.5–4.5)
GLUCOSE SERPL-MCNC: 85 MG/DL (ref 70–99)
HBA1C MFR BLD: 5.4 % (ref 4.8–5.6)
HCT VFR BLD AUTO: 39 % (ref 34–46.6)
HDLC SERPL-MCNC: 53 MG/DL
HGB BLD-MCNC: 12.3 G/DL (ref 11.1–15.9)
IMM GRANULOCYTES # BLD AUTO: 0 X10E3/UL (ref 0–0.1)
IMM GRANULOCYTES NFR BLD AUTO: 0 %
LDLC SERPL CALC-MCNC: 115 MG/DL (ref 0–99)
LDLC/HDLC SERPL: 2.2 RATIO (ref 0–3.2)
LYMPHOCYTES # BLD AUTO: 2.1 X10E3/UL (ref 0.7–3.1)
LYMPHOCYTES NFR BLD AUTO: 27 %
MCH RBC QN AUTO: 29.5 PG (ref 26.6–33)
MCHC RBC AUTO-ENTMCNC: 31.5 G/DL (ref 31.5–35.7)
MCV RBC AUTO: 94 FL (ref 79–97)
MONOCYTES # BLD AUTO: 0.4 X10E3/UL (ref 0.1–0.9)
MONOCYTES NFR BLD AUTO: 5 %
NEUTROPHILS # BLD AUTO: 5 X10E3/UL (ref 1.4–7)
NEUTROPHILS NFR BLD AUTO: 64 %
PLATELET # BLD AUTO: 220 X10E3/UL (ref 150–450)
POTASSIUM SERPL-SCNC: 4.7 MMOL/L (ref 3.5–5.2)
PROT SERPL-MCNC: 6.8 G/DL (ref 6–8.5)
RBC # BLD AUTO: 4.17 X10E6/UL (ref 3.77–5.28)
SODIUM SERPL-SCNC: 138 MMOL/L (ref 134–144)
T3 SERPL-MCNC: 144 NG/DL (ref 71–180)
T4 FREE SERPL-MCNC: 0.97 NG/DL (ref 0.82–1.77)
TRIGL SERPL-MCNC: 286 MG/DL (ref 0–149)
TSH SERPL DL<=0.005 MIU/L-ACNC: 8.9 UIU/ML (ref 0.45–4.5)
VLDLC SERPL CALC-MCNC: 49 MG/DL (ref 5–40)
WBC # BLD AUTO: 7.8 X10E3/UL (ref 3.4–10.8)

## 2024-09-01 RX ORDER — LEVOTHYROXINE SODIUM 75 UG/1
75 TABLET ORAL DAILY
Qty: 90 TABLET | Refills: 0 | Status: SHIPPED | OUTPATIENT
Start: 2024-09-01

## 2024-09-13 ENCOUNTER — TELEPHONE (OUTPATIENT)
Dept: FAMILY MEDICINE CLINIC | Facility: CLINIC | Age: 39
End: 2024-09-13
Payer: COMMERCIAL

## 2024-10-29 ENCOUNTER — OFFICE VISIT (OUTPATIENT)
Dept: ENDOCRINOLOGY | Facility: CLINIC | Age: 39
End: 2024-10-29
Payer: COMMERCIAL

## 2024-10-29 VITALS
HEART RATE: 51 BPM | OXYGEN SATURATION: 96 % | WEIGHT: 176 LBS | HEIGHT: 65 IN | SYSTOLIC BLOOD PRESSURE: 96 MMHG | DIASTOLIC BLOOD PRESSURE: 68 MMHG | BODY MASS INDEX: 29.32 KG/M2

## 2024-10-29 DIAGNOSIS — E03.9 HYPOTHYROIDISM, UNSPECIFIED TYPE: Primary | ICD-10-CM

## 2024-10-29 DIAGNOSIS — E66.3 OVERWEIGHT (BMI 25.0-29.9): ICD-10-CM

## 2024-10-29 PROCEDURE — 99204 OFFICE O/P NEW MOD 45 MIN: CPT | Performed by: INTERNAL MEDICINE

## 2024-10-29 NOTE — PROGRESS NOTES
-----------------------------------------------------------------  ENDOCRINE CLINIC NOTE  -----------------------------------------------------------------        PATIENT NAME: Wanda Henao  PATIENT : 1985 AGE: 39 y.o.  MRN NUMBER: [id number]  PRIMARY CARE: Linda Keane APRN    ==========================================================================    CHIEF COMPLAINT: Hypothyroidism  DATE OF SERVICE: 10/29/24    ==========================================================================    HPI / SUBJECTIVE    39 y.o. female is seen in the clinic today for evaluation of hypothyroidism.  Patient reports that she was diagnosed with hypothyroidism around 5 years ago.  Patient is currently taking levothyroxine 50 mcg every day.  Patient is properly taking medication.  Patient reports that for the same amount of time that is 5 years she had been experiencing hot flashes and night sweats.  Patient reports to have regular cycles.    - Fatigue: +ve  - Cold Intolerance: Reports hot intolerance and have hx of Raynaud's phenomena and have digital cold feeling  - Weight gain: None  - Appetite: good  - Anxiety / Depression: Stable after losing jobs  - HTN: not on meds  - Easy brusing: none  - Emotional lability: none  - Irritability: none  - Skin changes: none  - Hair changes: none    - Neck Swelling: none    - Family hx of thyroid problems: yes sister have thyroidectomy    - Last lab work: Aug 2024    ==========================================================================                                                PAST MEDICAL HISTORY    Past Medical History:   Diagnosis Date    Depression     Sertraline since .    Encounter for hepatitis C screening test for low risk patient     Hyperlipidemia     Hereditary    Hypothyroidism ?     Hot flashes, nightsweats.       ==========================================================================    PAST SURGICAL HISTORY    Past Surgical History:    Procedure Laterality Date    HIP SURGERY Left 1997       ==========================================================================    FAMILY HISTORY    Family History   Problem Relation Age of Onset    Tremor Mother     Anxiety disorder Mother     Arthritis Mother     Heart disease Father     Hyperlipidemia Father     Heart attack Father     Mental illness Father         On Eliud’s side of the family    No Known Problems Sister     Hyperlipidemia Sister     Thyroid disease Sister         Her thyroid has now been removed    Raynaud syndrome Sister     Depression Sister     Hyperlipidemia Brother     Tremor Brother        ==========================================================================    SOCIAL HISTORY    Social History     Socioeconomic History    Marital status:    Tobacco Use    Smoking status: Never    Smokeless tobacco: Current    Tobacco comments:     None   Vaping Use    Vaping status: Never Used   Substance and Sexual Activity    Alcohol use: Yes     Alcohol/week: 2.0 standard drinks of alcohol     Types: 2 Drinks containing 0.5 oz of alcohol per week    Drug use: Never    Sexual activity: Yes     Partners: Male     Birth control/protection: Birth control pill       ==========================================================================    MEDICATIONS      Current Outpatient Medications:     Estarylla 0.25-35 MG-MCG per tablet, TAKE 1 TABLET BY MOUTH DAILY, Disp: 84 tablet, Rfl: 0    rosuvastatin (Crestor) 10 MG tablet, Take 1 tablet by mouth Daily., Disp: 90 tablet, Rfl: 3    sertraline (ZOLOFT) 100 MG tablet, Take 1 tablet by mouth Daily., Disp: 180 tablet, Rfl: 2    ==========================================================================    ALLERGIES    Allergies   Allergen Reactions    Bee Venom Anaphylaxis       ==========================================================================    OBJECTIVE    Vitals:    10/29/24 1327   BP: 96/68   Pulse: 51   SpO2: 96%     Body mass index  is 29.29 kg/m².     General: Alert, cooperative, no acute distress  Thyroid:  no enlargement/tenderness/palpable nodules  Lungs: Clear to auscultation bilaterally, respirations unlabored  Heart: Regular rate and rhythm, S1 and S2 normal, no murmur, rub or gallop  Abdomen: Soft, NT, ND and Bowel sounds Positive  Extremities:  Extremities normal, atraumatic, no cyanosis or edema    ==========================================================================    LAB EVALUATION    Lab Results   Component Value Date    GLUCOSE 85 08/29/2024    BUN 16 08/29/2024    CREATININE 0.92 08/29/2024    BCR 17 08/29/2024    K 4.7 08/29/2024    CO2 21 08/29/2024    CALCIUM 9.3 08/29/2024    PROTENTOTREF 6.8 08/29/2024    ALBUMIN 4.3 08/29/2024    LABIL2 2.1 01/09/2024    AST 17 08/29/2024    ALT 10 08/29/2024    TRIG 286 (H) 08/29/2024    HDL 53 08/29/2024     (H) 08/29/2024     Lab Results   Component Value Date    HGBA1C 5.4 08/29/2024    HGBA1C 5.2 04/25/2023     Lab Results   Component Value Date    CREATININE 0.92 08/29/2024     Lab Results   Component Value Date    TSH 8.900 (H) 08/29/2024    FREET4 0.97 08/29/2024    THYROIDAB <3.00 04/01/2019       ==========================================================================    ASSESSMENT AND PLAN    # Hypothyroidism  - Discussed with patient in detail about pathophysiology of thyroid gland and thyroid hormone to which verbalized understanding  - Last blood work was in August 2024 which showed elevated TSH with normal free T4  - Patient previously had TPO antibodies done in 2019 which was negative  - Will repeat TPO antibodies again  - Additionally patient is on biotin therapy therefore we will always check free T4 with dialysis will continue to monitor TSH  - Patient is currently taking levothyroxine 50 mcg p.o. daily, properly taking medication  - Counseled patient to avoid taking any thyroid herbal medication to visualize understanding as well  - Will check thyroid  function in next 5 days after patient hold biotin therapy and then recheck back again in 2 months and in 4 months time  - Depending on to the levels will adjust therapy of levothyroxine  - Will also start patient on liothyronine low-dose 5 mcg in the morning as well  - Will also provide patient with the samples of Synthyroid today in the clinic to try brand-name and see if she feels different with it  - Patient to follow remotely back again in 4 months time    # Overweight with BMI of 29.29    Thank you for courtesy of consultation.    Return to clinic: 4 months    Entire assessment and plan was discussed and counseled the patient in detail to which patient verbalized understanding and agreed with care.  Answered all queries and concerns.    Part of this note may be an electronic transcription/translation of spoken language to printed text using the Dragon Dictation System.     Note: Portions of this note may have been copied from previous notes but documentation have been reviewed and edited as necessary to support clinical decision making for today's visit.    ==========================================================================    INFORMATION PROVIDED TO PATIENT    Patient Instructions   Please,    - Continue levothyroxine replacement therapy at 50 mcg p.o. daily.  - Stop taking biotin supplements and get thyroid lab work done in 5 days.  - Will review her blood work results and adjust therapy accordingly.  - Otherwise repeat blood work back again in 2 months time and in 4 months time    Clinical follow-up in 4 months time.    Thank you for your visit today.    If you have any questions or concerns please feel free to reach out of the office.       ==========================================================================  Jovan De La Cruz MD  Department of Endocrine, Diabetes and Metabolism  [facility], [place]  ==========================================================================

## 2024-10-29 NOTE — PATIENT INSTRUCTIONS
Please,    - Continue levothyroxine replacement therapy at 50 mcg p.o. daily.  - Stop taking biotin supplements and get thyroid lab work done in 5 days.  - Will review her blood work results and adjust therapy accordingly.  - Otherwise repeat blood work back again in 2 months time and in 4 months time    Clinical follow-up in 4 months time.    Thank you for your visit today.    If you have any questions or concerns please feel free to reach out of the office.

## 2024-11-05 DIAGNOSIS — E78.00 HYPERCHOLESTEREMIA: ICD-10-CM

## 2024-11-05 RX ORDER — NORGESTIMATE AND ETHINYL ESTRADIOL 0.25-0.035
1 KIT ORAL DAILY
Qty: 84 TABLET | Refills: 0 | Status: SHIPPED | OUTPATIENT
Start: 2024-11-05

## 2024-11-05 RX ORDER — SERTRALINE HYDROCHLORIDE 100 MG/1
100 TABLET, FILM COATED ORAL DAILY
Qty: 180 TABLET | Refills: 0 | Status: SHIPPED | OUTPATIENT
Start: 2024-11-05

## 2024-11-05 RX ORDER — ROSUVASTATIN CALCIUM 10 MG/1
10 TABLET, COATED ORAL DAILY
Qty: 90 TABLET | Refills: 0 | Status: SHIPPED | OUTPATIENT
Start: 2024-11-05

## 2024-11-05 NOTE — TELEPHONE ENCOUNTER
Wanda wants to know if she can have a 1 month supply on her meds this month and then after that she wants them to go to [facility] rx pharmacy. She also said that her Sertraline should be  1 to 2 pills a day.

## 2024-11-20 LAB
T4 FREE SERPL DIALY-MCNC: 2.4 NG/DL
TSH SERPL DL<=0.005 MIU/L-ACNC: 1.89 UIU/ML (ref 0.45–4.5)

## 2024-11-21 DIAGNOSIS — E78.00 HYPERCHOLESTEREMIA: ICD-10-CM

## 2024-11-24 RX ORDER — ROSUVASTATIN CALCIUM 10 MG/1
10 TABLET, COATED ORAL DAILY
Qty: 90 TABLET | Refills: 0 | Status: SHIPPED | OUTPATIENT
Start: 2024-11-24

## 2024-11-24 RX ORDER — SERTRALINE HYDROCHLORIDE 100 MG/1
100 TABLET, FILM COATED ORAL DAILY
Qty: 180 TABLET | Refills: 0 | Status: SHIPPED | OUTPATIENT
Start: 2024-11-24

## 2024-11-24 RX ORDER — NORGESTIMATE AND ETHINYL ESTRADIOL 0.25-0.035
1 KIT ORAL DAILY
Qty: 84 TABLET | Refills: 0 | OUTPATIENT
Start: 2024-11-24

## 2025-01-22 LAB
T4 FREE SERPL DIALY-MCNC: 1 NG/DL
TSH SERPL DL<=0.005 MIU/L-ACNC: 2.82 UIU/ML (ref 0.45–4.5)

## 2025-01-27 RX ORDER — NORGESTIMATE AND ETHINYL ESTRADIOL 0.25-0.035
1 KIT ORAL DAILY
Qty: 84 TABLET | Refills: 0 | OUTPATIENT
Start: 2025-01-27

## 2025-02-04 ENCOUNTER — TELEPHONE (OUTPATIENT)
Dept: FAMILY MEDICINE CLINIC | Facility: CLINIC | Age: 40
End: 2025-02-04
Payer: COMMERCIAL

## 2025-02-04 NOTE — TELEPHONE ENCOUNTER
Wanda came by and said that she got her pap smear done on Nov, 21 and she also had her mammogram done wanting to know if she can get her birthcontrol now that every thing is done, [facility] is the one she went to for her pap. She would like a 3 month supply of her birthcontrol at a time if  that is possible,

## 2025-02-10 ENCOUNTER — TELEPHONE (OUTPATIENT)
Dept: FAMILY MEDICINE CLINIC | Facility: CLINIC | Age: 40
End: 2025-02-10
Payer: COMMERCIAL

## 2025-02-10 NOTE — TELEPHONE ENCOUNTER
MAMMOGRAM DONE AT [facility] AROUND MAY (POSSIBLY AUGUST)  AND WAS CLEAR     Name:       Wanda Henao (Self) [phone] (Mobile)       HUB PROVIDED THE RELAY MESSAGE FROM THE OFFICE      PATIENT: VOICED UNDERSTANDING AND HAS NO FURTHER QUESTIONS AT THIS TIME    ADDITIONAL INFORMATION:

## 2025-02-10 NOTE — TELEPHONE ENCOUNTER
LVM regarding mammogram order. Inquiring if has been scheduled or has been completed, Instructed to call back to office.    Relay

## 2025-03-20 ENCOUNTER — TELEPHONE (OUTPATIENT)
Dept: FAMILY MEDICINE CLINIC | Facility: CLINIC | Age: 40
End: 2025-03-20
Payer: COMMERCIAL

## 2025-03-20 NOTE — TELEPHONE ENCOUNTER
Caller: Wanda Henao    Relationship: Self    Best call back number: [phone]    What test was performed: BLOODWORK    When was the test performed: 3.14.25    Where was the test performed: [place] OFFICE    Additional notes:   PATIENT IS REQUESTING A CALL BACK ABOUT HER BLOOD WORK RESULTS.

## 2025-03-20 NOTE — TELEPHONE ENCOUNTER
Hub relay    What labs are in question? No new lab results and the orders in system is for Dr De La Cruz, does she need to schedule to have labs done?

## 2025-03-23 LAB
T4 FREE SERPL DIALY-MCNC: 0.97 NG/DL
TSH SERPL DL<=0.005 MIU/L-ACNC: 2.52 UIU/ML (ref 0.45–4.5)

## 2025-04-09 ENCOUNTER — OFFICE VISIT (OUTPATIENT)
Dept: ENDOCRINOLOGY | Facility: CLINIC | Age: 40
End: 2025-04-09
Payer: COMMERCIAL

## 2025-04-09 VITALS
SYSTOLIC BLOOD PRESSURE: 120 MMHG | WEIGHT: 181 LBS | BODY MASS INDEX: 30.16 KG/M2 | DIASTOLIC BLOOD PRESSURE: 70 MMHG | OXYGEN SATURATION: 98 % | HEIGHT: 65 IN | HEART RATE: 53 BPM

## 2025-04-09 DIAGNOSIS — E66.9 OBESITY (BMI 30-39.9): ICD-10-CM

## 2025-04-09 DIAGNOSIS — E03.9 HYPOTHYROIDISM, UNSPECIFIED TYPE: Primary | ICD-10-CM

## 2025-04-09 PROCEDURE — 99214 OFFICE O/P EST MOD 30 MIN: CPT | Performed by: INTERNAL MEDICINE

## 2025-04-09 RX ORDER — LEVOTHYROXINE SODIUM 50 MCG
50 TABLET ORAL DAILY
Qty: 90 TABLET | Refills: 3 | Status: SHIPPED | OUTPATIENT
Start: 2025-04-09 | End: 2026-04-09

## 2025-04-09 RX ORDER — LEVOTHYROXINE SODIUM 50 UG/1
50 TABLET ORAL
COMMUNITY
End: 2025-04-09

## 2025-04-09 NOTE — PROGRESS NOTES
-----------------------------------------------------------------  ENDOCRINE CLINIC NOTE  -----------------------------------------------------------------        PATIENT NAME: Wanda Henao  PATIENT : 1985 AGE: 40 y.o.  MRN NUMBER: [id number]  PRIMARY CARE: Linda Keane APRN    ==========================================================================    CHIEF COMPLAINT: Hypothyroidism  DATE OF SERVICE: 25    ==========================================================================    HPI / SUBJECTIVE    40 y.o. female is seen in the clinic today for follow-up for hypothyroidism.  Patient is currently taking brand-name Synthyroid 50 mcg every day.  Properly taking medication.  Patient denies any active complaint.  History significant for Raynaud's phenomena.  Patient blood work is stable for hypothyroidism.  Currently on oral birth control.    ==========================================================================                                                PAST MEDICAL HISTORY    Past Medical History:   Diagnosis Date    Depression     Sertraline since .    Encounter for hepatitis C screening test for low risk patient 2023    Hyperlipidemia 2006    Hereditary    Hypothyroidism ? 2019    Hot flashes, nightsweats.       ==========================================================================    PAST SURGICAL HISTORY    Past Surgical History:   Procedure Laterality Date    HIP SURGERY Left 1997       ==========================================================================    FAMILY HISTORY    Family History   Problem Relation Age of Onset    Tremor Mother     Anxiety disorder Mother     Arthritis Mother     Heart disease Father     Hyperlipidemia Father     Heart attack Father     Mental illness Father         On Eliud’s side of the family    No Known Problems Sister     Hyperlipidemia Sister     Thyroid disease Sister         Her thyroid has now been removed    Raynaud  syndrome Sister     Depression Sister     Hyperlipidemia Brother     Tremor Brother        ==========================================================================    SOCIAL HISTORY    Social History     Socioeconomic History    Marital status:    Tobacco Use    Smoking status: Never    Smokeless tobacco: Current    Tobacco comments:     None   Vaping Use    Vaping status: Never Used   Substance and Sexual Activity    Alcohol use: Yes     Alcohol/week: 2.0 standard drinks of alcohol     Types: 2 Drinks containing 0.5 oz of alcohol per week    Drug use: Never    Sexual activity: Yes     Partners: Male     Birth control/protection: Birth control pill       ==========================================================================    MEDICATIONS      Current Outpatient Medications:     norgestimate-ethinyl estradiol (Estarylla) 0.25-35 MG-MCG per tablet, Take 1 tablet by mouth Daily., Disp: 84 tablet, Rfl: 0    rosuvastatin (Crestor) 10 MG tablet, Take 1 tablet by mouth Daily., Disp: 90 tablet, Rfl: 0    sertraline (ZOLOFT) 100 MG tablet, Take 1 tablet by mouth Daily., Disp: 180 tablet, Rfl: 0    Synthroid 50 MCG tablet, Take 1 tablet by mouth Daily., Disp: 90 tablet, Rfl: 3    ==========================================================================    ALLERGIES    Allergies   Allergen Reactions    Bee Venom Anaphylaxis       ==========================================================================    OBJECTIVE    Vitals:    04/09/25 1354   BP: 120/70   Pulse: 53   SpO2: 98%     Body mass index is 30.12 kg/m².     General: Alert, cooperative, no acute distress  Thyroid:  no enlargement/tenderness/palpable nodules  Lungs: Clear to auscultation bilaterally, respirations unlabored  Heart: Regular rate and rhythm, S1 and S2 normal, no murmur, rub or gallop  Abdomen: Soft, NT, ND and Bowel sounds Positive  Extremities:  Extremities normal, atraumatic, no cyanosis or  edema    ==========================================================================    LAB EVALUATION    Lab Results   Component Value Date    GLUCOSE 85 08/29/2024    BUN 16 08/29/2024    CREATININE 0.92 08/29/2024    BCR 17 08/29/2024    K 4.7 08/29/2024    CO2 21 08/29/2024    CALCIUM 9.3 08/29/2024    ALBUMIN 4.3 08/29/2024    LABIL2 1.4 05/05/2022    AST 17 08/29/2024    ALT 10 08/29/2024    TRIG 286 (H) 08/29/2024    HDL 53 08/29/2024     (H) 08/29/2024     Lab Results   Component Value Date    HGBA1C 5.4 08/29/2024    HGBA1C 5.2 04/25/2023     Lab Results   Component Value Date    CREATININE 0.92 08/29/2024     Lab Results   Component Value Date    TSH 2.520 03/14/2025    FREET4 0.97 08/29/2024    THYROIDAB <3.00 04/01/2019       ==========================================================================    ASSESSMENT AND PLAN    # Hypothyroidism  - Patient is tolerating brand-name Synthyroid 50 mcg every day  - Currently taking biotin therapy which needs to be placed on hold while patient is evaluating thyroid function but in order to maximize the benefit we will always check free T4 by dialysis  - Patient to repeat blood work in 6 months and in 12 months time with clinical follow-up in 12 months time    # Obesity with BMI of 30.12    Thank you for courtesy of consultation.    Return to clinic: 12 months    Entire assessment and plan was discussed and counseled the patient in detail to which patient verbalized understanding and agreed with care.  Answered all queries and concerns.    Part of this note may be an electronic transcription/translation of spoken language to printed text using the Dragon Dictation System.     Note: Portions of this note may have been copied from previous notes but documentation have been reviewed and edited as necessary to support clinical decision making for today's visit.    ==========================================================================    INFORMATION PROVIDED  TO PATIENT    Patient Instructions   Please,    - Continue Synthyroid 50 mcg p.o. daily.  - Repeat blood work in 6 months and in 12 months time with clinical follow-up in 12 months  - Please stop taking biotin supplements at least 5 days before getting the blood work done    Thank you for your visit today.    If you have any questions or concerns please feel free to reach out of the office.       ==========================================================================  Jovan De La Cruz MD  Department of Endocrine, Diabetes and Metabolism  [place], [place]  ==========================================================================

## 2025-04-09 NOTE — PATIENT INSTRUCTIONS
Please,    - Continue Synthyroid 50 mcg p.o. daily.  - Repeat blood work in 6 months and in 12 months time with clinical follow-up in 12 months  - Please stop taking biotin supplements at least 5 days before getting the blood work done    Thank you for your visit today.    If you have any questions or concerns please feel free to reach out of the office.

## 2025-05-01 DIAGNOSIS — E03.9 HYPOTHYROIDISM, UNSPECIFIED TYPE: Primary | ICD-10-CM

## 2025-05-01 RX ORDER — LEVOTHYROXINE SODIUM 50 MCG
50 TABLET ORAL DAILY
Qty: 90 TABLET | Refills: 3 | Status: SHIPPED | OUTPATIENT
Start: 2025-05-01 | End: 2025-05-05 | Stop reason: SDUPTHER

## 2025-05-05 DIAGNOSIS — E03.9 HYPOTHYROIDISM, UNSPECIFIED TYPE: ICD-10-CM

## 2025-05-05 DIAGNOSIS — E78.00 HYPERCHOLESTEREMIA: ICD-10-CM

## 2025-05-05 RX ORDER — ROSUVASTATIN CALCIUM 10 MG/1
10 TABLET, COATED ORAL DAILY
Qty: 30 TABLET | Refills: 0 | Status: SHIPPED | OUTPATIENT
Start: 2025-05-05

## 2025-05-05 RX ORDER — LEVOTHYROXINE SODIUM 50 MCG
50 TABLET ORAL DAILY
Qty: 90 TABLET | Refills: 3 | Status: SHIPPED | OUTPATIENT
Start: 2025-05-05 | End: 2026-05-05

## 2025-05-14 ENCOUNTER — OFFICE VISIT (OUTPATIENT)
Dept: FAMILY MEDICINE CLINIC | Facility: CLINIC | Age: 40
End: 2025-05-14

## 2025-05-14 VITALS
WEIGHT: 186 LBS | BODY MASS INDEX: 30.99 KG/M2 | TEMPERATURE: 95.7 F | DIASTOLIC BLOOD PRESSURE: 77 MMHG | HEIGHT: 65 IN | SYSTOLIC BLOOD PRESSURE: 113 MMHG | HEART RATE: 50 BPM | OXYGEN SATURATION: 100 %

## 2025-05-14 DIAGNOSIS — E03.9 HYPOTHYROIDISM, UNSPECIFIED TYPE: ICD-10-CM

## 2025-05-14 DIAGNOSIS — E66.09 CLASS 1 OBESITY DUE TO EXCESS CALORIES WITHOUT SERIOUS COMORBIDITY WITH BODY MASS INDEX (BMI) OF 30.0 TO 30.9 IN ADULT: ICD-10-CM

## 2025-05-14 DIAGNOSIS — E78.00 HYPERCHOLESTEREMIA: ICD-10-CM

## 2025-05-14 DIAGNOSIS — Z00.00 PREVENTATIVE HEALTH CARE: Primary | ICD-10-CM

## 2025-05-14 DIAGNOSIS — E66.811 CLASS 1 OBESITY DUE TO EXCESS CALORIES WITHOUT SERIOUS COMORBIDITY WITH BODY MASS INDEX (BMI) OF 30.0 TO 30.9 IN ADULT: ICD-10-CM

## 2025-05-14 DIAGNOSIS — R61 NIGHT SWEATS: ICD-10-CM

## 2025-05-14 RX ORDER — ROSUVASTATIN CALCIUM 10 MG/1
10 TABLET, COATED ORAL DAILY
Qty: 90 TABLET | Refills: 1 | Status: SHIPPED | OUTPATIENT
Start: 2025-05-14 | End: 2025-05-19

## 2025-05-14 RX ORDER — SERTRALINE HYDROCHLORIDE 100 MG/1
100 TABLET, FILM COATED ORAL DAILY
Qty: 180 TABLET | Refills: 0 | Status: SHIPPED | OUTPATIENT
Start: 2025-05-14

## 2025-05-14 NOTE — PROGRESS NOTES
"    Wanda Henao is a 40 y.o. female.     History of Present Illness  40-year-old white female with history of hypothyroidism who goes to endocrinology, renal disease, hyperlipidemia, herpes, depression anxiety comes in today for follow-up visit fasting blood work    Blood pressure 112/76 heart rate 50 she denies any chest pain, dyspnea, tachycardia or dizziness    Patient has been going to endocrinology now after getting a thyroid ultrasound and they have now got her thyroid levels under control she sees him annually now and we will draw thyroid levels every 6 months    On last visit patient had rash on scalp that she has had recur.  We put her on Valtrex prophylactically but it did not help.  She has been to dermatology twice and they really cannot figure out what is causing it    She was having hot flashes on last visit she is taking black cohosh over-the-counter and that has helped with the hot flashes    She has no new complaints.  Is doing well on the Zoloft for depression and anxiety.  Weight is 186 with a BMI of 30.9.  She has not been vaccinated for COVID she is up-to-date on eye exams.  Her mammogram is due in February 2026 and she is up-to-date on OB/GYN visits          Fasting blood work  Keep appointment with endocrinology  Follow-up 6 months          Hypothyroidism         The following portions of the patient's history were reviewed and updated as appropriate: allergies, current medications, past family history, past medical history, past social history, past surgical history, and problem list.    Vitals:    05/14/25 1052   BP: 113/77   BP Location: Right arm   Patient Position: Sitting   Cuff Size: Adult   Pulse: 50   Temp: 95.7 °F (35.4 °C)   TempSrc: Infrared   SpO2: 100%   Weight: 84.4 kg (186 lb)   Height: 165.1 cm (65\")       Past Medical History:   Diagnosis Date    Depression 2001    Sertraline since 2013.    Encounter for hepatitis C screening test for low risk patient 04/25/2023    " Hyperlipidemia 2006    Hereditary    Hypothyroidism ? 2019    Hot flashes, nightsweats.    Tremor 2023    Left hand. Occasionally anxiety related     Past Surgical History:   Procedure Laterality Date    HIP SURGERY Left 1997     Family History   Problem Relation Age of Onset    Tremor Mother     Anxiety disorder Mother     Arthritis Mother     Vision loss Mother         imacular degeneration, caterscts and glaucoma    Heart disease Father     Hyperlipidemia Father     Heart attack Father     Mental illness Father         On Eliud’s side of the family    No Known Problems Sister     Hyperlipidemia Sister     Thyroid disease Sister         Her thyroid has now been removed    Raynaud syndrome Sister     Depression Sister     Hyperlipidemia Brother     Tremor Brother      Immunization History   Administered Date(s) Administered    Flu Vaccine Quad PF 6-35MO 01/07/2019    Fluzone (or Fluarix & Flulaval for VFC) >6mos 01/07/2019    Hepatitis A 04/26/2006, 10/27/2006    Hepatitis B Adult/Adolescent IM 06/11/2002, 07/16/2002, 05/03/2006    IPV 05/03/2006    MMR 07/02/1985, 03/16/1995    Meningococcal MCV4P (Menactra) 04/26/2006    PPD Test 09/18/2006    Td (TDVAX) 07/21/2000    Tdap 05/03/2006, 08/01/2013    Typhoid Inactivated 09/20/2013    Typhoid Live 04/26/2006       Orders Only on 03/14/2025   Component Date Value Ref Range Status    Free T4 03/14/2025 0.97  ng/dL Final    Comment: This test was developed and its performance characteristics  determined by [facility]. It has not been cleared or approved  by the Food and Drug Administration.  Reference Range:  Pubertal Children and Adults:  0.8 - 1.7  Pregnant Females    1st Trimester (0-13.3 wks):  0.65 - 1.4    2nd Trimester (13.4-26.6 wks):  0.5 - 1.3    3rd Trimester (>26.6 wks):  0.5 - 1.1      TSH 03/14/2025 2.520  0.450 - 4.500 uIU/mL Final         Review of Systems   Constitutional: Negative.    HENT: Negative.     Respiratory: Negative.     Cardiovascular:  Negative.    Gastrointestinal: Negative.    Genitourinary: Negative.    Musculoskeletal: Negative.    Skin: Negative.    Neurological: Negative.    Psychiatric/Behavioral: Negative.         Objective   Physical Exam  Constitutional:       Appearance: Normal appearance.   HENT:      Head: Normocephalic.   Cardiovascular:      Rate and Rhythm: Normal rate and regular rhythm.      Pulses: Normal pulses.      Heart sounds: Normal heart sounds.   Pulmonary:      Effort: Pulmonary effort is normal.      Breath sounds: Normal breath sounds.   Abdominal:      General: Bowel sounds are normal.   Musculoskeletal:         General: Normal range of motion.   Skin:     General: Skin is warm.   Neurological:      General: No focal deficit present.      Mental Status: She is alert and oriented to person, place, and time.   Psychiatric:         Mood and Affect: Mood normal.         Behavior: Behavior normal.         Procedures    Assessment & Plan   Diagnoses and all orders for this visit:    1. Preventative health care (Primary)  -     CBC & Differential  -     Comprehensive Metabolic Panel  -     Hemoglobin A1c    2. Hypercholesteremia  -     rosuvastatin (Crestor) 10 MG tablet; Take 1 tablet by mouth Daily.  Dispense: 90 tablet; Refill: 1  -     Lipid Panel With LDL / HDL Ratio    Other orders  -     sertraline (ZOLOFT) 100 MG tablet; Take 1 tablet by mouth Daily.  Dispense: 180 tablet; Refill: 0           Current Outpatient Medications:     norgestimate-ethinyl estradiol (Estarylla) 0.25-35 MG-MCG per tablet, Take 1 tablet by mouth Daily., Disp: 84 tablet, Rfl: 0    rosuvastatin (Crestor) 10 MG tablet, Take 1 tablet by mouth Daily., Disp: 90 tablet, Rfl: 1    sertraline (ZOLOFT) 100 MG tablet, Take 1 tablet by mouth Daily., Disp: 180 tablet, Rfl: 0    Synthroid 50 MCG tablet, Take 1 tablet by mouth Daily., Disp: 90 tablet, Rfl: 3           Linda Keane, APRN 5/14/2025 11:10 EDT  This note has been electronically signed

## 2025-05-15 LAB
ALBUMIN SERPL-MCNC: 4.7 G/DL (ref 3.9–4.9)
ALP SERPL-CCNC: 54 IU/L (ref 44–121)
ALT SERPL-CCNC: 13 IU/L (ref 0–32)
AST SERPL-CCNC: 21 IU/L (ref 0–40)
BASOPHILS # BLD AUTO: 0 X10E3/UL (ref 0–0.2)
BASOPHILS NFR BLD AUTO: 0 %
BILIRUB SERPL-MCNC: 0.5 MG/DL (ref 0–1.2)
BUN SERPL-MCNC: 15 MG/DL (ref 6–24)
BUN/CREAT SERPL: 17 (ref 9–23)
CALCIUM SERPL-MCNC: 9.9 MG/DL (ref 8.7–10.2)
CHLORIDE SERPL-SCNC: 104 MMOL/L (ref 96–106)
CHOLEST SERPL-MCNC: 236 MG/DL (ref 100–199)
CO2 SERPL-SCNC: 21 MMOL/L (ref 20–29)
CREAT SERPL-MCNC: 0.89 MG/DL (ref 0.57–1)
EGFRCR SERPLBLD CKD-EPI 2021: 83 ML/MIN/1.73
EOSINOPHIL # BLD AUTO: 0.1 X10E3/UL (ref 0–0.4)
EOSINOPHIL NFR BLD AUTO: 2 %
ERYTHROCYTE [DISTWIDTH] IN BLOOD BY AUTOMATED COUNT: 13.4 % (ref 11.7–15.4)
GLOBULIN SER CALC-MCNC: 2.8 G/DL (ref 1.5–4.5)
GLUCOSE SERPL-MCNC: 82 MG/DL (ref 70–99)
HBA1C MFR BLD: 5.4 % (ref 4.8–5.6)
HCT VFR BLD AUTO: 40.3 % (ref 34–46.6)
HDLC SERPL-MCNC: 61 MG/DL
HGB BLD-MCNC: 12.7 G/DL (ref 11.1–15.9)
IMM GRANULOCYTES # BLD AUTO: 0 X10E3/UL (ref 0–0.1)
IMM GRANULOCYTES NFR BLD AUTO: 0 %
LDLC SERPL CALC-MCNC: 140 MG/DL (ref 0–99)
LDLC/HDLC SERPL: 2.3 RATIO (ref 0–3.2)
LYMPHOCYTES # BLD AUTO: 2 X10E3/UL (ref 0.7–3.1)
LYMPHOCYTES NFR BLD AUTO: 31 %
MCH RBC QN AUTO: 29 PG (ref 26.6–33)
MCHC RBC AUTO-ENTMCNC: 31.5 G/DL (ref 31.5–35.7)
MCV RBC AUTO: 92 FL (ref 79–97)
MONOCYTES # BLD AUTO: 0.4 X10E3/UL (ref 0.1–0.9)
MONOCYTES NFR BLD AUTO: 6 %
NEUTROPHILS # BLD AUTO: 3.9 X10E3/UL (ref 1.4–7)
NEUTROPHILS NFR BLD AUTO: 61 %
PLATELET # BLD AUTO: 252 X10E3/UL (ref 150–450)
POTASSIUM SERPL-SCNC: 5.6 MMOL/L (ref 3.5–5.2)
PROT SERPL-MCNC: 7.5 G/DL (ref 6–8.5)
RBC # BLD AUTO: 4.38 X10E6/UL (ref 3.77–5.28)
SODIUM SERPL-SCNC: 141 MMOL/L (ref 134–144)
TRIGL SERPL-MCNC: 194 MG/DL (ref 0–149)
VLDLC SERPL CALC-MCNC: 35 MG/DL (ref 5–40)
WBC # BLD AUTO: 6.3 X10E3/UL (ref 3.4–10.8)

## 2025-05-19 ENCOUNTER — RESULTS FOLLOW-UP (OUTPATIENT)
Dept: FAMILY MEDICINE CLINIC | Facility: CLINIC | Age: 40
End: 2025-05-19

## 2025-05-19 RX ORDER — ROSUVASTATIN CALCIUM 20 MG/1
20 TABLET, COATED ORAL DAILY
Qty: 90 TABLET | Refills: 0 | Status: SHIPPED | OUTPATIENT
Start: 2025-05-19

## 2025-05-20 NOTE — TELEPHONE ENCOUNTER
Hub relay    Potassium is a little high she needs to avoid bananas or any supplements with potassium in  It  Lipid panel is worsened I increased her Crestor to 20 mg she can take 2 of the 10's  at bedtime if she has not 1 of those left.  Rest of labs okay

## 2025-07-07 RX ORDER — ROSUVASTATIN CALCIUM 20 MG/1
20 TABLET, COATED ORAL DAILY
Qty: 90 TABLET | Refills: 0 | Status: CANCELLED | OUTPATIENT
Start: 2025-07-07

## 2025-08-04 RX ORDER — SERTRALINE HYDROCHLORIDE 100 MG/1
100 TABLET, FILM COATED ORAL DAILY
Qty: 180 TABLET | Refills: 0 | Status: SHIPPED | OUTPATIENT
Start: 2025-08-04